# Patient Record
Sex: FEMALE | Race: WHITE | NOT HISPANIC OR LATINO | Employment: UNEMPLOYED | URBAN - METROPOLITAN AREA
[De-identification: names, ages, dates, MRNs, and addresses within clinical notes are randomized per-mention and may not be internally consistent; named-entity substitution may affect disease eponyms.]

---

## 2019-04-03 ENCOUNTER — HISTORICAL (OUTPATIENT)
Dept: ADMINISTRATIVE | Facility: HOSPITAL | Age: 24
End: 2019-04-03

## 2020-07-10 ENCOUNTER — OFFICE VISIT (OUTPATIENT)
Dept: OBSTETRICS AND GYNECOLOGY | Facility: CLINIC | Age: 25
End: 2020-07-10
Payer: COMMERCIAL

## 2020-07-10 ENCOUNTER — LAB VISIT (OUTPATIENT)
Dept: LAB | Facility: HOSPITAL | Age: 25
End: 2020-07-10
Attending: NURSE PRACTITIONER
Payer: COMMERCIAL

## 2020-07-10 VITALS
SYSTOLIC BLOOD PRESSURE: 134 MMHG | BODY MASS INDEX: 37.63 KG/M2 | HEIGHT: 64 IN | DIASTOLIC BLOOD PRESSURE: 80 MMHG | WEIGHT: 220.44 LBS

## 2020-07-10 DIAGNOSIS — Z01.419 ROUTINE GYNECOLOGICAL EXAMINATION: Primary | ICD-10-CM

## 2020-07-10 DIAGNOSIS — E28.2 PCOS (POLYCYSTIC OVARIAN SYNDROME): ICD-10-CM

## 2020-07-10 DIAGNOSIS — Z11.3 SCREENING FOR STDS (SEXUALLY TRANSMITTED DISEASES): ICD-10-CM

## 2020-07-10 DIAGNOSIS — N92.6 IRREGULAR BLEEDING: ICD-10-CM

## 2020-07-10 DIAGNOSIS — Z32.02 ENCOUNTER FOR PREGNANCY TEST, RESULT NEGATIVE: ICD-10-CM

## 2020-07-10 LAB
BASOPHILS # BLD AUTO: 0.03 K/UL (ref 0–0.2)
BASOPHILS NFR BLD: 0.4 % (ref 0–1.9)
DIFFERENTIAL METHOD: NORMAL
EOSINOPHIL # BLD AUTO: 0.1 K/UL (ref 0–0.5)
EOSINOPHIL NFR BLD: 1.3 % (ref 0–8)
ERYTHROCYTE [DISTWIDTH] IN BLOOD BY AUTOMATED COUNT: 12.1 % (ref 11.5–14.5)
HCT VFR BLD AUTO: 43.8 % (ref 37–48.5)
HGB BLD-MCNC: 14.7 G/DL (ref 12–16)
IMM GRANULOCYTES # BLD AUTO: 0.02 K/UL (ref 0–0.04)
IMM GRANULOCYTES NFR BLD AUTO: 0.3 % (ref 0–0.5)
LYMPHOCYTES # BLD AUTO: 1.5 K/UL (ref 1–4.8)
LYMPHOCYTES NFR BLD: 20.8 % (ref 18–48)
MCH RBC QN AUTO: 30.9 PG (ref 27–31)
MCHC RBC AUTO-ENTMCNC: 33.6 G/DL (ref 32–36)
MCV RBC AUTO: 92 FL (ref 82–98)
MONOCYTES # BLD AUTO: 0.5 K/UL (ref 0.3–1)
MONOCYTES NFR BLD: 7.4 % (ref 4–15)
NEUTROPHILS # BLD AUTO: 4.9 K/UL (ref 1.8–7.7)
NEUTROPHILS NFR BLD: 69.8 % (ref 38–73)
NRBC BLD-RTO: 0 /100 WBC
PLATELET # BLD AUTO: 202 K/UL (ref 150–350)
PMV BLD AUTO: 10.5 FL (ref 9.2–12.9)
RBC # BLD AUTO: 4.75 M/UL (ref 4–5.4)
WBC # BLD AUTO: 7.01 K/UL (ref 3.9–12.7)

## 2020-07-10 PROCEDURE — 99999 PR PBB SHADOW E&M-NEW PATIENT-LVL III: CPT | Mod: PBBFAC,,, | Performed by: NURSE PRACTITIONER

## 2020-07-10 PROCEDURE — 81025 URINE PREGNANCY TEST: CPT | Mod: S$GLB,,, | Performed by: NURSE PRACTITIONER

## 2020-07-10 PROCEDURE — 99385 PR PREVENTIVE VISIT,NEW,18-39: ICD-10-PCS | Mod: S$GLB,,, | Performed by: NURSE PRACTITIONER

## 2020-07-10 PROCEDURE — 99999 PR PBB SHADOW E&M-NEW PATIENT-LVL III: ICD-10-PCS | Mod: PBBFAC,,, | Performed by: NURSE PRACTITIONER

## 2020-07-10 PROCEDURE — 3008F BODY MASS INDEX DOCD: CPT | Mod: CPTII,S$GLB,, | Performed by: NURSE PRACTITIONER

## 2020-07-10 PROCEDURE — 85025 COMPLETE CBC W/AUTO DIFF WBC: CPT

## 2020-07-10 PROCEDURE — 99385 PREV VISIT NEW AGE 18-39: CPT | Mod: S$GLB,,, | Performed by: NURSE PRACTITIONER

## 2020-07-10 PROCEDURE — 36415 COLL VENOUS BLD VENIPUNCTURE: CPT

## 2020-07-10 PROCEDURE — 84443 ASSAY THYROID STIM HORMONE: CPT

## 2020-07-10 PROCEDURE — 81025 PR  URINE PREGNANCY TEST: ICD-10-PCS | Mod: S$GLB,,, | Performed by: NURSE PRACTITIONER

## 2020-07-10 PROCEDURE — 87491 CHLMYD TRACH DNA AMP PROBE: CPT

## 2020-07-10 PROCEDURE — 3008F PR BODY MASS INDEX (BMI) DOCUMENTED: ICD-10-PCS | Mod: CPTII,S$GLB,, | Performed by: NURSE PRACTITIONER

## 2020-07-10 NOTE — PROGRESS NOTES
CC: Well woman exam    HPI  Diane Lopez is a 24 y.o. female  presents for a well woman exam.  LMP: Patient's last menstrual period was 2020..  C/O of irregular cycles due to PCOS and client is seeking pregnancy. Advised client that the treatment for PCOS is weight loss and birth control to regulate cycles. Verbalizes understanding prefers losing weight as treatment for PCOS.      Past Medical History:   Diagnosis Date    PCOS (polycystic ovarian syndrome)      Past Surgical History:   Procedure Laterality Date    MOUTH SURGERY       Social History     Socioeconomic History    Marital status:      Spouse name: Not on file    Number of children: Not on file    Years of education: Not on file    Highest education level: Not on file   Occupational History    Not on file   Social Needs    Financial resource strain: Not on file    Food insecurity     Worry: Not on file     Inability: Not on file    Transportation needs     Medical: Not on file     Non-medical: Not on file   Tobacco Use    Smoking status: Current Every Day Smoker    Smokeless tobacco: Never Used   Substance and Sexual Activity    Alcohol use: Yes     Frequency: 2-4 times a month    Drug use: Never    Sexual activity: Yes     Partners: Male     Birth control/protection: None   Lifestyle    Physical activity     Days per week: Not on file     Minutes per session: Not on file    Stress: Not on file   Relationships    Social connections     Talks on phone: Not on file     Gets together: Not on file     Attends Buddhism service: Not on file     Active member of club or organization: Not on file     Attends meetings of clubs or organizations: Not on file     Relationship status: Not on file   Other Topics Concern    Not on file   Social History Narrative    Not on file     Family History   Problem Relation Age of Onset    Hypertension Paternal Grandfather     Liver disease Paternal Grandmother     Diabetes Maternal  "Grandmother     Hypertension Maternal Grandfather     Liver disease Father     Diabetes Mother     Hypertension Mother     Breast cancer Neg Hx      OB History        2    Para   0    Term   0       0    AB   2    Living   0       SAB   2    TAB   0    Ectopic   0    Multiple   0    Live Births   0               No current outpatient medications on file.    GYNECOLOGY HISTORY:  Seeking pregnancy   Declines birth control     DATA REVIEWED:  Last pap: 2019 Normal pap next pap due 2022    /80   Ht 5' 3.5" (1.613 m)   Wt 100 kg (220 lb 7.4 oz)   LMP 2020   BMI 38.44 kg/m²     Review of Systems   Constitutional: Negative.    HENT: Negative.    Eyes: Negative.    Respiratory: Negative.    Cardiovascular: Negative.    Gastrointestinal: Negative.    Endocrine: Negative.    Genitourinary: Negative.    Musculoskeletal: Negative.    Skin: Negative.    Allergic/Immunologic: Negative.    Neurological: Negative.    Hematological: Negative.    Psychiatric/Behavioral: Negative.        Physical Exam  Constitutional:       Appearance: Normal appearance.   HENT:      Head: Normocephalic.      Nose: Nose normal.      Mouth/Throat:      Mouth: Mucous membranes are moist.   Eyes:      Extraocular Movements: Extraocular movements intact.   Neck:      Musculoskeletal: Normal range of motion.   Pulmonary:      Effort: Pulmonary effort is normal.   Abdominal:      General: Abdomen is flat.      Palpations: Abdomen is soft.   Genitourinary:     General: Normal vulva.      Exam position: Supine.      Rectum: Normal.      Comments: Menstrual blood in vaginal vault   Musculoskeletal: Normal range of motion.   Skin:     General: Skin is warm and dry.   Neurological:      Mental Status: She is alert and oriented to person, place, and time.   Psychiatric:         Mood and Affect: Mood normal.         Behavior: Behavior normal.         Thought Content: Thought content normal.         Judgment: Judgment normal. "         Routine gynecological examination    Encounter for pregnancy test, result negative  -     POCT Urine Pregnancy    Screening for STDs (sexually transmitted diseases)  -     C. trachomatis/N. gonorrhoeae by AMP DNA [facility]; Urine    Irregular bleeding  -     CBC auto differential; Future; Expected date: 07/10/2020  -     TSH; Future; Expected date: 07/10/2020    PCOS (polycystic ovarian syndrome)        Patient was counseled today on A.C.S. Pap guidelines (Q3) and recommendations for yearly pelvic exams, yearly mammograms starting age 40, and clinical breast exams; to see her PCP for other health maintenance.

## 2020-07-11 LAB — TSH SERPL DL<=0.005 MIU/L-ACNC: 0.8 UIU/ML (ref 0.4–4)

## 2020-07-14 LAB
C TRACH DNA SPEC QL NAA+PROBE: NOT DETECTED
N GONORRHOEA DNA SPEC QL NAA+PROBE: NOT DETECTED

## 2020-07-16 ENCOUNTER — TELEPHONE (OUTPATIENT)
Dept: OBSTETRICS AND GYNECOLOGY | Facility: CLINIC | Age: 25
End: 2020-07-16

## 2020-07-16 DIAGNOSIS — N92.1 PROLONGED MENSTRUAL CYCLE: Primary | ICD-10-CM

## 2020-07-16 RX ORDER — MEDROXYPROGESTERONE ACETATE 10 MG/1
10 TABLET ORAL DAILY
Qty: 10 TABLET | Refills: 2 | Status: SHIPPED | OUTPATIENT
Start: 2020-07-16 | End: 2020-09-28

## 2020-07-16 NOTE — TELEPHONE ENCOUNTER
Spoke with client   Client identifiers verified   Will prescribe provera 10 mg times 10 days monthly. Will prescribe 3 month supply, verbalizes understanding that she will call and let me know if medication is working so that I can order refills for the rest of the year.  Informed client that this is not a birth, aware that she is seeking pregnancy. Prescription sent to pharmacy

## 2020-09-25 ENCOUNTER — TELEPHONE (OUTPATIENT)
Dept: OBSTETRICS AND GYNECOLOGY | Facility: CLINIC | Age: 25
End: 2020-09-25

## 2020-09-25 NOTE — TELEPHONE ENCOUNTER
----- Message from Rina Guevara sent at 9/24/2020  3:04 PM CDT -----  Contact: Patient @ [phone]  Good Afternoon,  Patient would like to discuss if need a visit    Please call and advise

## 2020-09-25 NOTE — TELEPHONE ENCOUNTER
Pt called c/o missed period.Hx of PCOS. LMP was 8/12/20 but at home upt is negative. C/o thick white discharge and itching on yesterday but no itching today.     Notified provider, pt is scheduled for 9/28/20 4:45 PM. Patient verbalized understanding

## 2020-09-28 ENCOUNTER — OFFICE VISIT (OUTPATIENT)
Dept: OBSTETRICS AND GYNECOLOGY | Facility: CLINIC | Age: 25
End: 2020-09-28
Payer: COMMERCIAL

## 2020-09-28 VITALS
BODY MASS INDEX: 39.33 KG/M2 | WEIGHT: 230.38 LBS | HEIGHT: 64 IN | SYSTOLIC BLOOD PRESSURE: 120 MMHG | DIASTOLIC BLOOD PRESSURE: 78 MMHG

## 2020-09-28 DIAGNOSIS — Z32.00 ENCOUNTER FOR PREGNANCY TEST, RESULT UNKNOWN: Primary | ICD-10-CM

## 2020-09-28 DIAGNOSIS — N92.6 IRREGULAR BLEEDING: ICD-10-CM

## 2020-09-28 PROCEDURE — 99213 PR OFFICE/OUTPT VISIT, EST, LEVL III, 20-29 MIN: ICD-10-PCS | Mod: S$GLB,,, | Performed by: NURSE PRACTITIONER

## 2020-09-28 PROCEDURE — 99999 PR PBB SHADOW E&M-EST. PATIENT-LVL III: ICD-10-PCS | Mod: PBBFAC,,, | Performed by: NURSE PRACTITIONER

## 2020-09-28 PROCEDURE — 99999 PR PBB SHADOW E&M-EST. PATIENT-LVL III: CPT | Mod: PBBFAC,,, | Performed by: NURSE PRACTITIONER

## 2020-09-28 PROCEDURE — 99213 OFFICE O/P EST LOW 20 MIN: CPT | Mod: S$GLB,,, | Performed by: NURSE PRACTITIONER

## 2020-09-28 PROCEDURE — 3008F BODY MASS INDEX DOCD: CPT | Mod: CPTII,S$GLB,, | Performed by: NURSE PRACTITIONER

## 2020-09-28 PROCEDURE — 3008F PR BODY MASS INDEX (BMI) DOCUMENTED: ICD-10-PCS | Mod: CPTII,S$GLB,, | Performed by: NURSE PRACTITIONER

## 2020-09-28 RX ORDER — MEDROXYPROGESTERONE ACETATE 10 MG/1
10 TABLET ORAL DAILY
Qty: 10 TABLET | Refills: 0 | Status: SHIPPED | OUTPATIENT
Start: 2020-09-28 | End: 2021-09-28

## 2020-09-28 NOTE — PROGRESS NOTES
"CC:   Diane Lopez is a 25 y.o. female    States that she thinks she is pregnant due to bilateral breast soreness and possible yeast infection. Although pregnancy test at home are negative, she still thinks that she is pregnant because these were the symptoms she had last time she was pregnant.  Client has PCOS and cycles are irregular but when the come on they can last anywhere from a couple of day to a couple of weeks.     Past Medical History:   Diagnosis Date    PCOS (polycystic ovarian syndrome)      Past Surgical History:   Procedure Laterality Date    MOUTH SURGERY       Social History     Tobacco Use    Smoking status: Current Every Day Smoker    Smokeless tobacco: Never Used   Substance Use Topics    Alcohol use: Yes     Frequency: 2-4 times a month    Drug use: Never     Family History   Problem Relation Age of Onset    Hypertension Paternal Grandfather     Liver disease Paternal Grandmother     Diabetes Maternal Grandmother     Hypertension Maternal Grandfather     Liver disease Father     Diabetes Mother     Hypertension Mother     Breast cancer Neg Hx      OB History    Para Term  AB Living   2 0 0 0 2 0   SAB TAB Ectopic Multiple Live Births   2 0 0 0 0      # Outcome Date GA Lbr Garrett/2nd Weight Sex Delivery Anes PTL Lv   2 SAB            1 SAB                /78   Ht 5' 3.5" (1.613 m)   Wt 104.5 kg (230 lb 6.1 oz)   BMI 40.17 kg/m²     ROS:  Review of Systems   Genitourinary:        Breast pain   Possible yeast infection          PHYSICAL EXAM:  OBGyn Exam        ASSESSMENT and PLAN:    ICD-10-CM ICD-9-CM    1. Encounter for pregnancy test, result unknown  Z32.00 V72.40 hCG, quantitative   2. Irregular bleeding  N92.6 626.4 medroxyPROGESTERone (PROVERA) 10 MG tablet     Initiation of Progesterone pending hcg quantitative pregnancy results.  "

## 2020-10-02 ENCOUNTER — LAB VISIT (OUTPATIENT)
Dept: LAB | Facility: HOSPITAL | Age: 25
End: 2020-10-02
Attending: NURSE PRACTITIONER
Payer: COMMERCIAL

## 2020-10-02 DIAGNOSIS — Z32.00 ENCOUNTER FOR PREGNANCY TEST, RESULT UNKNOWN: ICD-10-CM

## 2020-10-02 PROCEDURE — 84702 CHORIONIC GONADOTROPIN TEST: CPT

## 2020-10-02 PROCEDURE — 36415 COLL VENOUS BLD VENIPUNCTURE: CPT

## 2020-10-03 LAB — HCG INTACT+B SERPL-ACNC: <1.2 MIU/ML

## 2020-10-13 ENCOUNTER — PATIENT MESSAGE (OUTPATIENT)
Dept: PHARMACY | Facility: CLINIC | Age: 25
End: 2020-10-13

## 2020-11-16 ENCOUNTER — TELEPHONE (OUTPATIENT)
Dept: OBSTETRICS AND GYNECOLOGY | Facility: CLINIC | Age: 25
End: 2020-11-16

## 2020-11-16 ENCOUNTER — PATIENT MESSAGE (OUTPATIENT)
Dept: OBSTETRICS AND GYNECOLOGY | Facility: CLINIC | Age: 25
End: 2020-11-16

## 2020-11-16 NOTE — TELEPHONE ENCOUNTER
Spoke with pt. Answered questions pertaining to her pregnancy confirmation visit.  Advised her to come alone and that she could face time u/s with her  if she like. Pt understood and will be seen on Friday 11/20/20 @  [facility]

## 2020-11-16 NOTE — TELEPHONE ENCOUNTER
----- Message from Ariella Plummer sent at 11/16/2020 11:59 AM CST -----  Regarding: appt  Contact: pt  Caller is requesting a call back regarding an appt.  Please call back at [phone] .  Thanks.

## 2020-11-16 NOTE — TELEPHONE ENCOUNTER
----- Message from Kathryn Maddox sent at 11/16/2020  4:09 PM CST -----  Contact: Patient [phone]  Type: Patient Call Back    Who called: Patient     What is the request in detail: Patient states that she wanted to speak to the nurse before getting appointment scheduled. Has questions about the testing. Patient is scheduled for Fri., 11-20-20. Please call.     Would the patient rather a call back or a response via [facility]? Call back    Best call back number: [phone]

## 2020-11-16 NOTE — TELEPHONE ENCOUNTER
----- Message from Kathryn Maddox sent at 11/16/2020  4:09 PM CST -----  Contact: Patient [phone]  Type: Patient Call Back    Who called: Patient     What is the request in detail: Patient states that she wanted to speak to the nurse before getting appointment scheduled. Has questions about the testing. Patient is scheduled for Fri., 11-20-20. Please call.     Would the patient rather a call back or a response via [facility]? Call back    Best call back number: [phone]

## 2020-11-16 NOTE — TELEPHONE ENCOUNTER
----- Message from Kathryn Maddox sent at 11/16/2020  4:09 PM CST -----  Contact: Patient [phone]  Type: Patient Call Back    Who called: Patient     What is the request in detail: Patient states that she wanted to speak to the nurse before getting appointment scheduled. Has questions about the testing. Patient is scheduled for Fri., 11-20-20. Please call.     Would the patient rather a call back or a response via [facility]? Call back    Best call back number: [phone]

## 2020-11-16 NOTE — TELEPHONE ENCOUNTER
Spoke with pt answered questions about her pregnancy confirmation appt on Friday 11/20/20. Advised her to come alone for her appt and that she could face time with her  for u/s. Patient said she understood and will be seen Friday 11/20/20 morning  LJ

## 2020-11-16 NOTE — TELEPHONE ENCOUNTER
----- Message from Tee Suazo sent at 11/16/2020 12:21 PM CST -----  Regarding: pt  .Type:  Patient Returning Call    Who Called:pt   Who Left Message for Patient:nurse   Does the patient know what this is regarding?:unsure   Would the patient rather a call back or a response via [facility]? Call back   Best Call Back Number:.[phone] (home)   Additional Information:       Thank you ,   Tee Suazo

## 2022-04-07 ENCOUNTER — HISTORICAL (OUTPATIENT)
Dept: ADMINISTRATIVE | Facility: HOSPITAL | Age: 27
End: 2022-04-07
Payer: COMMERCIAL

## 2022-04-24 VITALS
BODY MASS INDEX: 35.84 KG/M2 | WEIGHT: 202.25 LBS | SYSTOLIC BLOOD PRESSURE: 111 MMHG | HEIGHT: 63 IN | DIASTOLIC BLOOD PRESSURE: 66 MMHG